# Patient Record
Sex: FEMALE | URBAN - METROPOLITAN AREA
[De-identification: names, ages, dates, MRNs, and addresses within clinical notes are randomized per-mention and may not be internally consistent; named-entity substitution may affect disease eponyms.]

---

## 2024-07-19 ENCOUNTER — HOSPITAL ENCOUNTER (EMERGENCY)
Facility: HOSPITAL | Age: 44
Discharge: HOME | End: 2024-07-19
Attending: EMERGENCY MEDICINE
Payer: MEDICAID

## 2024-07-19 VITALS
HEART RATE: 104 BPM | SYSTOLIC BLOOD PRESSURE: 160 MMHG | BODY MASS INDEX: 37.76 KG/M2 | OXYGEN SATURATION: 99 % | RESPIRATION RATE: 19 BRPM | TEMPERATURE: 98 F | WEIGHT: 220 LBS | DIASTOLIC BLOOD PRESSURE: 100 MMHG

## 2024-07-19 DIAGNOSIS — S02.5XXA CLOSED FRACTURE OF TOOTH, INITIAL ENCOUNTER: Primary | ICD-10-CM

## 2024-07-19 DIAGNOSIS — K02.9 DENTAL CARIES: ICD-10-CM

## 2024-07-19 LAB
B-HCG UR QL: NEGATIVE
CTP QC/QA: YES

## 2024-07-19 PROCEDURE — 25000003 PHARM REV CODE 250: Mod: ER | Performed by: EMERGENCY MEDICINE

## 2024-07-19 PROCEDURE — 99283 EMERGENCY DEPT VISIT LOW MDM: CPT | Mod: ER

## 2024-07-19 PROCEDURE — 81025 URINE PREGNANCY TEST: CPT | Mod: ER | Performed by: EMERGENCY MEDICINE

## 2024-07-19 RX ORDER — KETOROLAC TROMETHAMINE 10 MG/1
10 TABLET, FILM COATED ORAL
Status: COMPLETED | OUTPATIENT
Start: 2024-07-19 | End: 2024-07-19

## 2024-07-19 RX ADMIN — KETOROLAC TROMETHAMINE 10 MG: 10 TABLET, FILM COATED ORAL at 09:07

## 2024-07-20 NOTE — ED PROVIDER NOTES
ED Provider Note - 7/19/2024    History     Chief Complaint   Patient presents with    Dental Pain     Reports to ED c c/o R upper tooth pain. States thinks she cracked a tooth on bone while eating chicken. Denies taking any OTC pain medications. Pt self drove.      Patient presents with a chief complaint of dental pain.  Abrupt onset noted this PM after biting into some chicken.  This is localized to the upper R jaw.  Patient denies trismus.  Patient denies fever and swelling.          Review of patient's allergies indicates:   Allergen Reactions    Bactrim [sulfamethoxazole-trimethoprim]      History reviewed. No pertinent past medical history.  History reviewed. No pertinent surgical history.  No family history on file.  Social History     Tobacco Use    Smoking status: Every Day     Types: Cigarettes   Substance Use Topics    Alcohol use: Not Currently    Drug use: Not Currently     Review of Systems   Constitutional:  Negative for chills and fever.   HENT:  Positive for dental problem. Negative for congestion and rhinorrhea.    Respiratory:  Negative for cough and shortness of breath.    Cardiovascular:  Negative for chest pain and palpitations.   Gastrointestinal:  Negative for abdominal pain, diarrhea and vomiting.   Genitourinary:  Negative for difficulty urinating and dysuria.   Skin:  Negative for color change and rash.   Neurological:  Negative for dizziness and light-headedness.   Hematological:  Negative for adenopathy. Does not bruise/bleed easily.       Physical Exam     Initial Vitals [07/19/24 2129]   BP Pulse Resp Temp SpO2   (!) 177/86 (!) 111 19 98.1 °F (36.7 °C) 98 %      MAP       --         Vitals:    07/19/24 2129 07/19/24 2204   BP: (!) 177/86 (!) 160/100   Pulse: (!) 111 104   Resp: 19 19   Temp: 98.1 °F (36.7 °C)    TempSrc: Oral    SpO2: 98% 99%   Weight: 99.8 kg (220 lb)      Physical Exam    Constitutional: She appears well-developed and well-nourished.   HENT:   Mouth/Throat: Dental  caries present.       Cardiovascular:  Normal rate and regular rhythm.           Pulmonary/Chest: No respiratory distress.     Neurological: She is alert and oriented to person, place, and time.         ED Course   Procedures                   MDM        LABS     Labs Reviewed   POCT URINE PREGNANCY       Result Value    POC Preg Test, Ur Negative       Acceptable Yes             All available results from the labs ordered were independently reviewed. with findings as follows: UPT negative     Imaging     Imaging Results    None                EKG        ED Management/Discussion     Medications   ketorolac tablet 10 mg (10 mg Oral Given 7/19/24 6412)                 The patient's list of active medical problems, social history, medications, and allergies as documented per RN staff has been reviewed.               On final assessment, the patient appears suitable for discharge.  I see no indication of an emergent process beyond that addressed during our encounter but have duly counseled the patient/family regarding the need for prompt follow-up as well as the indications that should prompt immediate return to the emergency room.  The patient/family has been provided with language -specific verbal and printed direction regarding our final diagnosis(es) as well as instructions regarding use of OTC and/or Rx medications intended to manage the patient's aforementioned conditions including:  ED Prescriptions    None           Patient has been advised of the following recommended follow-up instructions:  Follow-up Information       Follow up With Specialties Details Why Contact Info    [facility] [place]  Schedule an appointment as soon as possible for a visit on 7/22/2024 for reassessment [address] [address] [phone]    [facility] - Emergency Dept Emergency Medicine Go to  As needed, If symptoms worsen [address] [facility]  Emergency Department  [facility]  [id number]  [phone]          The patient/family communicates understanding of all this information and all remaining questions and concerns were addressed at this time.      Referrals:  No orders of the defined types were placed in this encounter.      CLINICAL IMPRESSION    ICD-10-CM ICD-9-CM   1. Closed fracture of tooth, initial encounter  S02.5XXA 873.63   2. Dental caries  K02.9 521.00          ED Disposition Condition    Discharge Stable                 Daljit Barrera MD  07/20/24 0249

## 2024-07-20 NOTE — ED TRIAGE NOTES
Reports to ED c c/o R upper tooth pain. States thinks she cracked a tooth on bone while eating chicken. Denies taking any OTC pain medications. Pt self drove